# Patient Record
Sex: MALE | Race: WHITE
[De-identification: names, ages, dates, MRNs, and addresses within clinical notes are randomized per-mention and may not be internally consistent; named-entity substitution may affect disease eponyms.]

---

## 2020-03-11 ENCOUNTER — HOSPITAL ENCOUNTER (OUTPATIENT)
Dept: HOSPITAL 11 - JP.SDS | Age: 40
Discharge: HOME | End: 2020-03-11
Attending: ORTHOPAEDIC SURGERY
Payer: OTHER GOVERNMENT

## 2020-03-11 DIAGNOSIS — M21.851: ICD-10-CM

## 2020-03-11 DIAGNOSIS — M25.851: ICD-10-CM

## 2020-03-11 DIAGNOSIS — Z91.041: ICD-10-CM

## 2020-03-11 DIAGNOSIS — S73.191A: Primary | ICD-10-CM
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
## 2020-03-11 DIAGNOSIS — Z88.5: ICD-10-CM

## 2020-03-11 PROCEDURE — C1713 ANCHOR/SCREW BN/BN,TIS/BN: HCPCS

## 2020-03-20 NOTE — OR
DATE OF PROCEDURE:  03/11/2020

 

SURGEON:  Stephane Wallace MD

 

PREOPERATIVE DIAGNOSIS:  Femoral acetabular impingement, right hip with 
combined pincer and

CAM deformity.

 

POSTOPERATIVE DIAGNOSES:

1. 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 
deformity.

2. Acetabular labral tear.

 

PROCEDURES:

1. Arthroscopy right hip with acetabular rim resection and repair of acetabular 
labrum.

2. Osteoplasty of femoral head and neck junction.

 

ANESTHESIA:  General.

 

INDICATIONS:  Walker is a 39-year-old gentleman with a history of bilateral hip 
pain,

currently right worse than left.  X-rays and examination are consistent with 
significant

femoral acetabular impingement with a combined pincer and CAM deformity.  Right 
hip shows

cystic changes in the femoral head and neck junction consistent with 
impingement.  He now

presents for right hip arthroscopy for evaluation of the labrum and takedown of 
the labrum

and rim resection and osteoplasty of the CAM deformity.  Risks, benefits, 
potential

complications of the procedure were discussed.

 

DESCRIPTION OF PROCEDURE:  After adequate anesthesia was obtained, the patient 
was placed on

the fracture table.  Light countertraction was placed on the left leg.  A large 
padded

perineal post was utilized.  Slight traction was placed on the right hip which 
is in a

slightly flexed position.  Right hip was then prepped and draped in a sterile 
fashion.  A C-

arm fluoroscopy was brought in and a long spinal needle was advanced towards 
the hip joint.

Traction was then placed on the hip, achieving approximately 1 cm of 
distraction.  Needle

was then advanced into the hip without difficulty.  This was placed from a 
superior lateral

position just above the trochanter.  Guide pin was placed through the needle.  
Skin incision

was made and the scope cannula was then advanced over the guide pin into the 
joint

atraumatically.  Scope was introduced.  Femoral head and edge of the labrum 
were identified.

Fraying was noted on the labrum both superior and laterally.  Some mild 
delamination noted

more anteriorly.  Hip capsule was visualized anteriorly and an anterolateral 
portal was then

established using the same technique over guidewire.  [place] blade was then 
used to connect

the anterolateral portal to the lateral portal performing a capsulotomy.  
Further evaluation

of the joint revealed intact articular cartilage in the femoral head.  The 
majority of the

weightbearing surface of the acetabulum was intact with the exception of a 
small area

directly anterior.  Lateral labrum showed significant fraying.  After 
capsulotomy was

adequately established and visualized both from the superolateral and 
anterolateral portals,

scope was placed in the superolateral portal and working through the 
anterolateral one,

capsule was divided off the edge of the acetabulum superiorly above the labrum 
exposing the

pincer deformity.  The long [place] blade was then used to detach the labrum.  A 
mia was

then used to perform a rim resection removing approximately 5 mm of rim.  This 
was monitored

with fluoroscopy.  Once the crossover sign had been eliminated, the labrum was 
then repaired

back to the edge of the acetabulum.  2 Mitek Gryphon anchors were placed into 
the acetabular

rim.  One limb of the suture anchors was passed around the labrum and then tied 
down

standard arthroscopic technique with the knot away from the articular surface.  
Solid repair

was accomplished.  Traction was released and adequate seal of the femoral head 
and labrum

was confirmed.  Capsular incision was then extended distally along the neck.

 

A mia was then used to perform an osteoplasty contouring the femoral head and 
neck

junction.  This was accomplished both with the hip extended and internally 
rotated, removing

more of the superior lateral aspect and with the hip flexed internally and 
externally

rotated working around the femoral head and neck junction.  Area of the cyst 
was identified

and contoured.  This continued with both the direct visualization and 
fluoroscopic

confirmation until adequate resection was obtained.  Scope was then withdrawn.  
Port sites

were closed with 3-0 Monocryl and Steri-Strips were applied.  Port sites and 
the hip joint

were infiltrated with 0.25% Marcaine and sterile dressings were applied.  The 
patient

tolerated the procedure very well.  There were no complications.  Length of the 
procedure

was fairly extensive due to bleeding from the capsular edges which required 
continued

control using the Mitek ablation radiofrequency.  He was taken from the 
operating room in a

stable condition.

 

 

 

 

Stephane Wallace MD

DD:  03/20/2020 14:53:41

DT:  03/20/2020 16:36:56

Job #:  [id number]

JOSÉ

## 2022-03-07 ENCOUNTER — HOSPITAL ENCOUNTER (OUTPATIENT)
Dept: HOSPITAL 11 - JP.SDS | Age: 42
Discharge: HOME | End: 2022-03-07
Attending: ORTHOPAEDIC SURGERY
Payer: OTHER GOVERNMENT

## 2022-03-07 DIAGNOSIS — F41.9: ICD-10-CM

## 2022-03-07 DIAGNOSIS — J44.9: ICD-10-CM

## 2022-03-07 DIAGNOSIS — J45.909: ICD-10-CM

## 2022-03-07 DIAGNOSIS — M25.852: Primary | ICD-10-CM

## 2022-03-07 DIAGNOSIS — Z88.5: ICD-10-CM

## 2022-03-07 DIAGNOSIS — S73.192A: ICD-10-CM

## 2022-03-07 PROCEDURE — C1713 ANCHOR/SCREW BN/BN,TIS/BN: HCPCS

## 2022-03-07 PROCEDURE — C1769 GUIDE WIRE: HCPCS

## 2023-01-21 ENCOUNTER — HOSPITAL ENCOUNTER (EMERGENCY)
Dept: HOSPITAL 11 - JP.ED | Age: 43
Discharge: HOME | End: 2023-01-21
Payer: OTHER GOVERNMENT

## 2023-01-21 DIAGNOSIS — S63.601A: Primary | ICD-10-CM

## 2023-01-21 DIAGNOSIS — Z79.899: ICD-10-CM

## 2023-01-21 DIAGNOSIS — J44.9: ICD-10-CM
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
## 2023-01-21 DIAGNOSIS — Z88.5: ICD-10-CM

## 2023-01-21 DIAGNOSIS — Z91.041: ICD-10-CM
